# Patient Record
(demographics unavailable — no encounter records)

---

## 2024-10-09 NOTE — REASON FOR VISIT
[Home] : at home, [unfilled] , at the time of the visit. [Medical Office: ([facility])___] : at the medical office located in  [Patient] : the patient [Self] : self [Follow-Up Visit] : a follow-up pain management visit

## 2024-10-09 NOTE — DISCUSSION/SUMMARY
[Medication Risks Reviewed] : Medication risks reviewed [de-identified] : Prescriptions renewed. [place]obt[place]  reviewed and appropriate. SOAPP-R completed on chart. The patient's medications are documented to the best of their ability. Quality of life and functional ability improved on medications. The patient is showing no aberrant behavior or evidence of diversion. The patient was advised not to use narcotic medication while operating an automobile or heavy machinery due to potential sedation or dizziness. The patient was educated to the risks associated with potential opioid dependence and addiction. Urine toxicology screens as per office protocol. Use of multimodal analgesia used prn. Follow up one month.

## 2024-10-09 NOTE — HISTORY OF PRESENT ILLNESS
[Neck] : neck [Lower back] : lower back [Right Leg] : right leg [6] : 6 [4] : 4 [Stabbing] : stabbing [Throbbing] : throbbing [Tingling] : tingling [Constant] : constant [Household chores] : household chores [Work] : work [Sleep] : sleep [Rest] : rest [Meds] : meds [Sitting] : sitting [Standing] : standing [Walking] : walking [Full time] : Work status: full time [Steroid] : Steroid [FreeTextEntry1] : Chronic neck and back pain stable. Pain meds effective prn.  [] : no [FreeTextEntry6] : NUMBNESS  [FreeTextEntry7] : DOWN RIGHT LEG  [FreeTextEntry9] : ACUPUNCTURE  [de-identified] : 2021 [de-identified] : LUMBAR  [de-identified] : EPIDURAL  [TWNoteComboBox1] : 30%

## 2024-11-12 NOTE — DISCUSSION/SUMMARY
[Medication Risks Reviewed] : Medication risks reviewed [de-identified] : Prescriptions renewed. [place] and appropriate. SOAPP-R completed on chart. The patient's medications are documented to the best of their ability. Quality of life and functional ability improved on medications. The patient is showing no aberrant behavior or evidence of diversion. The patient was advised not to use narcotic medication while operating an automobile or heavy machinery due to potential sedation or dizziness. The patient was educated to the risks associated with potential opioid dependence and addiction. Urine toxicology screens as per office protocol. Use of multimodal analgesia used prn. Please consider this a formal request for a Lumbar MRI and pending review to consider interventional injections as indicated.  Follow up one month.

## 2024-11-12 NOTE — HISTORY OF PRESENT ILLNESS
[Neck] : neck [Lower back] : lower back [Right Leg] : right leg [6] : 6 [4] : 4 [Stabbing] : stabbing [Throbbing] : throbbing [Tingling] : tingling [Constant] : constant [Household chores] : household chores [Work] : work [Sleep] : sleep [Rest] : rest [Meds] : meds [Sitting] : sitting [Standing] : standing [Walking] : walking [Full time] : Work status: full time [Steroid] : Steroid [FreeTextEntry1] : States increased right sided low back pain with associated numbness to his right outer thigh. Notes these are his usual symptoms and were relieved in the past by a Right Lumbar facet block. Last procedure was 12- at L3/4, L4/5 and L5/S1. Denies tripping/ falling or any bladder/bowel issues. 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 Notes he is unable to go to the gym and perform his usual exercise routine due to pain. Pain meds effective prn. He continues working. Last Lumbar MRI on file was 2013 and to obtain a new study.  [] : no [FreeTextEntry6] : NUMBNESS  [FreeTextEntry7] : DOWN RIGHT LEG  [FreeTextEntry9] : ACUPUNCTURE  [de-identified] : 2021 [de-identified] : LUMBAR  [de-identified] : EPIDURAL  [TWNoteComboBox1] : 30%

## 2024-11-12 NOTE — ASSESSMENT
[FreeTextEntry1] : Alert and oriented X 3. Antalgic gait. Unable to sit comfortably erect in chair, leaning to left side. Positive right straight leg raise. Altered sensation to right outer thigh. Lumbar ROM decreased.

## 2024-12-11 NOTE — HISTORY OF PRESENT ILLNESS
[Neck] : neck [Lower back] : lower back [Right Leg] : right leg [6] : 6 [4] : 4 [Stabbing] : stabbing [Throbbing] : throbbing [Tingling] : tingling [Constant] : constant [Household chores] : household chores [Work] : work [Sleep] : sleep [Rest] : rest [Meds] : meds [Sitting] : sitting [Standing] : standing [Walking] : walking [Full time] : Work status: full time [Steroid] : Steroid [FreeTextEntry1] : States low back and right leg pain severe at times. Chiropractic care helpful at times. Pain meds effective prn. Continues working. Lumbar facet blocks effective for him in the past.  [] : no [FreeTextEntry6] : NUMBNESS  [FreeTextEntry7] : DOWN RIGHT LEG  [FreeTextEntry9] : ACUPUNCTURE  [de-identified] : 2021 [de-identified] : LUMBAR  [de-identified] : EPIDURAL  [TWNoteComboBox1] : 30%

## 2024-12-11 NOTE — REASON FOR VISIT
[Home] : at home, [unfilled] , at the time of the visit. [Medical Office: ([facility])___] : at the medical office located in  [Patient] : the patient [Self] : self [Follow-Up Visit] : a follow-up pain management visit

## 2024-12-11 NOTE — DISCUSSION/SUMMARY
[Medication Risks Reviewed] : Medication risks reviewed [de-identified] : Prescriptions renewed. [place] and appropriate. SOAPP-R completed on chart. The patient's medications are documented to the best of their ability. Quality of life and functional ability improved on medications. The patient is showing no aberrant behavior or evidence of diversion. The patient was advised not to use narcotic medication while operating an automobile or heavy machinery due to potential sedation or dizziness. The patient was educated to the risks associated with potential opioid dependence and addiction. Urine toxicology screens as per office protocol. Use of multimodal analgesia used prn. Please consider this a formal request for a RIGHT LUMBAR FACET BLOCK ATL3/4 L4/5 AND L5/S1   procedure as the patient has stated a greater than 60% sustained pain relief for at least three months. Last procedure was 12-.  Spoke to patient about interventional options. Explained risks, benefits and alternatives including but not limited to the risk of infection, bleeding, spinal headache, nerve injury and increased pain. States understanding of above and willing to proceed. Follow up one month.

## 2025-01-08 NOTE — HISTORY OF PRESENT ILLNESS
[Neck] : neck [Lower back] : lower back [Right Leg] : right leg [4] : 4 [2] : 2 [Stabbing] : stabbing [Throbbing] : throbbing [Tingling] : tingling [Constant] : constant [Household chores] : household chores [Work] : work [Sleep] : sleep [Rest] : rest [Meds] : meds [Sitting] : sitting [Standing] : standing [Walking] : walking [Full time] : Work status: full time [Steroid] : Steroid [FreeTextEntry1] : S/P right  Lumbar facet block and states pain much improved. ADL improved. Continues working. Pain meds effective prn.  [] : no [FreeTextEntry6] : NUMBNESS  [FreeTextEntry7] : DOWN RIGHT LEG  [FreeTextEntry9] : ACUPUNCTURE  [de-identified] : 2021 [de-identified] : LUMBAR  [de-identified] : EPIDURAL  [TWNoteComboBox1] : 30%

## 2025-01-08 NOTE — REASON FOR VISIT
[Home] : at home, [unfilled] , at the time of the visit. [Medical Office: ([facility])___] : at the medical office located in  [Patient] : the patient [Self] : self [Follow-Up Visit] : a follow-up pain management visit

## 2025-02-12 NOTE — HISTORY OF PRESENT ILLNESS
[Neck] : neck [Lower back] : lower back [Right Leg] : right leg [4] : 4 [Stabbing] : stabbing [Throbbing] : throbbing [Tingling] : tingling [Constant] : constant [Household chores] : household chores [Work] : work [Sleep] : sleep [Rest] : rest [Meds] : meds [Sitting] : sitting [Standing] : standing [Walking] : walking [Full time] : Work status: full time [Steroid] : Steroid [FreeTextEntry1] : States Right lumbar facet injection 12- was very effective in reducing his back pain. Pain meds effective. prn. Continues working.  [] : no [FreeTextEntry7] : DOWN RIGHT LEG  [FreeTextEntry6] : NUMBNESS  [FreeTextEntry9] : ACUPUNCTURE  [de-identified] : 2021 [de-identified] : LUMBAR  [de-identified] : EPIDURAL  [TWNoteComboBox1] : 30%

## 2025-02-12 NOTE — DISCUSSION/SUMMARY
[Medication Risks Reviewed] : Medication risks reviewed [de-identified] : Prescriptions renewed. [place]obt[place]  reviewed and appropriate. SOAPP-R completed on chart. The patient's medications are documented to the best of their ability. Quality of life and functional ability improved on medications. The patient is showing no aberrant behavior or evidence of diversion. The patient was advised not to use narcotic medication while operating an automobile or heavy machinery due to potential sedation or dizziness. The patient was educated to the risks associated with potential opioid dependence and addiction. Urine toxicology screens as per office protocol. Use of multimodal analgesia used prn. Follow up one month.

## 2025-02-12 NOTE — REASON FOR VISIT
[Home] : at home, [unfilled] , at the time of the visit. [Medical Office: ([facility])___] : at the medical office located in  [Telehealth (audio & video)] : This visit was provided via telehealth using real-time 2-way audio visual technology. [Follow-Up Visit] : a follow-up pain management visit

## 2025-03-11 NOTE — HISTORY OF PRESENT ILLNESS
[Neck] : neck [Lower back] : lower back [Right Leg] : right leg [4] : 4 [Stabbing] : stabbing [Throbbing] : throbbing [Tingling] : tingling [Constant] : constant [Household chores] : household chores [Work] : work [Sleep] : sleep [Rest] : rest [Meds] : meds [Sitting] : sitting [Standing] : standing [Walking] : walking [Full time] : Work status: full time [Steroid] : Steroid [FreeTextEntry1] : Neck and back pain stable. Pain meds effective prn.  Maintains a functional ADL. [] : no [FreeTextEntry6] : NUMBNESS  [FreeTextEntry7] : DOWN RIGHT LEG  [FreeTextEntry9] : ACUPUNCTURE  [de-identified] : 2021 [de-identified] : LUMBAR  [de-identified] : EPIDURAL  [TWNoteComboBox1] : 30%

## 2025-04-07 NOTE — DISCUSSION/SUMMARY
[Medication Risks Reviewed] : Medication risks reviewed [de-identified] : Prescriptions renewed. [place]obt[place]  reviewed and appropriate. SOAPP-R completed on chart. The patient's medications are documented to the best of their ability. Quality of life and functional ability improved on medications. The patient is showing no aberrant behavior or evidence of diversion. The patient was advised not to use narcotic medication while operating an automobile or heavy machinery due to potential sedation or dizziness. The patient was educated to the risks associated with potential opioid dependence and addiction. Urine toxicology screens as per office protocol. Use of multimodal analgesia used prn. Follow up one month.

## 2025-04-07 NOTE — HISTORY OF PRESENT ILLNESS
[Neck] : neck [Lower back] : lower back [Right Leg] : right leg [7] : 7 [5] : 5 [Stabbing] : stabbing [Throbbing] : throbbing [Tingling] : tingling [Constant] : constant [Household chores] : household chores [Work] : work [Sleep] : sleep [Rest] : rest [Meds] : meds [Sitting] : sitting [Standing] : standing [Walking] : walking [Full time] : Work status: full time [Steroid] : Steroid [FreeTextEntry1] : Chronic neck and back pain stable. Pain meds effective. prn.  Maintains a functional ADL. Continues working.  [] : no [FreeTextEntry6] : NUMBNESS  [FreeTextEntry7] : DOWN RIGHT LEG  [FreeTextEntry9] : ACUPUNCTURE  [de-identified] : 2021 [de-identified] : LUMBAR  [de-identified] : EPIDURAL  [TWNoteComboBox1] : 30%

## 2025-04-07 NOTE — REASON FOR VISIT
[Home] : at home, [unfilled] , at the time of the visit. [Medical Office: ([facility])___] : at the medical office located in  [Telehealth (audio & video)] : This visit was provided via telehealth using real-time 2-way audio visual technology. [Follow-Up Visit] : a follow-up pain management visit

## 2025-05-12 NOTE — REASON FOR VISIT
[Home] : at home, [unfilled] , at the time of the visit. [Medical Office: ([facility])___] : at the medical office located in  [Telehealth (audio & video)] : This visit was provided via telehealth using real-time 2-way audio visual technology. [Follow-Up Visit] : a follow-up pain management visit

## 2025-05-12 NOTE — DISCUSSION/SUMMARY
[Medication Risks Reviewed] : Medication risks reviewed [de-identified] : Prescriptions renewed. [place]obt[place]  reviewed and appropriate. SOAPP-R completed on chart. The patient's medications are documented to the best of their ability. Quality of life and functional ability improved on medications. The patient is showing no aberrant behavior or evidence of diversion. The patient was advised not to use narcotic medication while operating an automobile or heavy machinery due to potential sedation or dizziness. The patient was educated to the risks associated with potential opioid dependence and addiction. Urine toxicology screens as per office protocol. Use of multimodal analgesia used prn. Follow up one month.

## 2025-05-12 NOTE — HISTORY OF PRESENT ILLNESS
[Neck] : neck [Lower back] : lower back [Right Leg] : right leg [6] : 6 [4] : 4 [Stabbing] : stabbing [Throbbing] : throbbing [Tingling] : tingling [Constant] : constant [Household chores] : household chores [Work] : work [Sleep] : sleep [Rest] : rest [Meds] : meds [Sitting] : sitting [Standing] : standing [Walking] : walking [Full time] : Work status: full time [Steroid] : Steroid [FreeTextEntry1] : States. neck and back. pain stable. Pain meds effective. prn.  Maintains a functional ADL. [] : no [FreeTextEntry6] : NUMBNESS  [FreeTextEntry7] : DOWN RIGHT LEG  [FreeTextEntry9] : ACUPUNCTURE  [de-identified] : 2021 [de-identified] : LUMBAR  [de-identified] : EPIDURAL  [TWNoteComboBox1] : 30%

## 2025-06-27 NOTE — ASSESSMENT
[FreeTextEntry1] : Interim history The patient is tolerating their medications without problems. There has no new pains, injuries, or complaints and no new issues. The use of medications appears appropriate and there are no aberrant behaviors noted. Side effects to current medications are denied. Average pain score for the month is 7 out of ten. The patient's current medications are documented to the best of their ability. The  was obtained and reviewed prior to the visit, and any discrepancies were discussed with the patient. Objective information Since the last visit there are no additional radiologic studies, labs, or pain complaints. There are no changes in the patient's physical status. Plan The patient was given refill of their medication at their current level and will return to the office as needed for follow-up. The patient is showing no aberrant behavior or evidence of diversion. Opioid contract and opioid risk assessment on chart.  reviewed and any discrepancy discussed with patent. Applicable urine toxicology reviewed and recorded in the patient's electronic record. Urine toxicology is ordered for patient per office protocol or patient's risk assessment.  Patient will follow-up in 1 month unless new issues arise the patient has returned earlier.  This note was generated by using Dragon medical dictation software.  A reasonable effort has been made for proofreading its contents, but typos may still remain.  If there are any questions or points of clarification needed, please notify my office.

## 2025-06-27 NOTE — HISTORY OF PRESENT ILLNESS
[Neck] : neck [Lower back] : lower back [Right Leg] : right leg [6] : 6 [4] : 4 [Stabbing] : stabbing [Throbbing] : throbbing [Tingling] : tingling [Constant] : constant [Household chores] : household chores [Work] : work [Sleep] : sleep [Rest] : rest [Meds] : meds [Sitting] : sitting [Standing] : standing [Walking] : walking [Full time] : Work status: full time [Steroid] : Steroid [Home] : at home, [unfilled] , at the time of the visit. [Medical Office: ([facility])___] : at the medical office located in  [Telehealth (audio & video)] : This visit was provided via telehealth using real-time 2-way audio visual technology. [Verbal consent obtained from patient] : the patient, [unfilled] [FreeTextEntry1] : TONEYVA LAWSWILMER  IS FOLLOWING UP FOR PAIN MED REFILL, THERE HAS BEEN DECREASED CHANGES IN PAIN SINCE LAST VISIT.   LAST UDS: 3/11/25 [] : no [FreeTextEntry6] : NUMBNESS  [FreeTextEntry7] : DOWN RIGHT LEG  [FreeTextEntry9] : ACUPUNCTURE  [de-identified] : 2021 [de-identified] : LUMBAR  [de-identified] : EPIDURAL  [TWNoteComboBox1] : 30%

## 2025-07-23 NOTE — HISTORY OF PRESENT ILLNESS
[Neck] : neck [Lower back] : lower back [5] : 5 [Dull/Aching] : dull/aching [Shooting] : shooting [Throbbing] : throbbing [Frequent] : frequent [Meds] : meds [Heat] : heat [Massage] : massage [Standing] : standing [Home] : at home, [unfilled] , at the time of the visit. [Medical Office: ([facility])___] : at the medical office located in  [Telehealth (audio & video)] : This visit was provided via telehealth using real-time 2-way audio visual technology. [Verbal consent obtained from patient] : the patient, [unfilled] [] : no [FreeTextEntry7] : RIGHT LEG [de-identified] : COLD WEATHER